# Patient Record
Sex: FEMALE | URBAN - METROPOLITAN AREA
[De-identification: names, ages, dates, MRNs, and addresses within clinical notes are randomized per-mention and may not be internally consistent; named-entity substitution may affect disease eponyms.]

---

## 2024-10-01 ENCOUNTER — OFFICE VISIT (OUTPATIENT)
Dept: URGENT CARE | Age: 39
End: 2024-10-01
Payer: COMMERCIAL

## 2024-10-01 VITALS
TEMPERATURE: 98 F | DIASTOLIC BLOOD PRESSURE: 81 MMHG | OXYGEN SATURATION: 100 % | RESPIRATION RATE: 14 BRPM | SYSTOLIC BLOOD PRESSURE: 128 MMHG | HEART RATE: 49 BPM | WEIGHT: 150 LBS

## 2024-10-01 DIAGNOSIS — N30.01 ACUTE CYSTITIS WITH HEMATURIA: Primary | ICD-10-CM

## 2024-10-01 DIAGNOSIS — R30.0 DYSURIA: ICD-10-CM

## 2024-10-01 LAB
POC APPEARANCE, URINE: ABNORMAL
POC BILIRUBIN, URINE: NEGATIVE
POC BLOOD, URINE: ABNORMAL
POC COLOR, URINE: YELLOW
POC GLUCOSE, URINE: NEGATIVE MG/DL
POC KETONES, URINE: NEGATIVE MG/DL
POC LEUKOCYTES, URINE: ABNORMAL
POC NITRITE,URINE: NEGATIVE
POC PH, URINE: 6 PH
POC PROTEIN, URINE: NEGATIVE MG/DL
POC SPECIFIC GRAVITY, URINE: 1.01
POC UROBILINOGEN, URINE: 0.2 EU/DL
PREGNANCY TEST URINE, POC: NEGATIVE

## 2024-10-01 PROCEDURE — 87086 URINE CULTURE/COLONY COUNT: CPT

## 2024-10-01 RX ORDER — NITROFURANTOIN 25; 75 MG/1; MG/1
100 CAPSULE ORAL 2 TIMES DAILY
Qty: 14 CAPSULE | Refills: 0 | Status: SHIPPED | OUTPATIENT
Start: 2024-10-01 | End: 2024-10-08

## 2024-10-01 ASSESSMENT — PAIN SCALES - GENERAL: PAINLEVEL: 3

## 2024-10-01 NOTE — PROGRESS NOTES
Subjective   Patient ID: Madelaine Mariscal is a 39 y.o. female. They present today with a chief complaint of Female Dysuria (Pressure and burning with urination X today. ).    History of Present Illness  Cindy is a 39-year-old female who presents to the urgent care for evaluation of urinary burning and pelvic pressure for about 1 day.  Patient denies fever, severe abdominal pain or vomiting or diarrhea.  Patient requesting evaluation for possible UTI.  Patient denies any abnormal discharge.  No other symptoms or concerns otherwise reported.    Past Medical History  Allergies as of 10/01/2024    (No Known Allergies)       (Not in a hospital admission)       No past medical history on file.    No past surgical history on file.         Review of Systems  A 10-point review of systems was performed, otherwise unremarkable unless stated in the history of present illness.                Objective    Vitals:    10/01/24 1851   BP: 128/81   Pulse: (!) 49   Resp: 14   Temp: 36.7 °C (98 °F)   SpO2: 100%   Weight: 68 kg (150 lb)     No LMP recorded.    Gen: Vitals noted and reviewed, no evidence of acute distress, well developed and afebrile.   Psych: Mood and affect appropriate for setting.  Head/Face: Atraumatic and normocephalic.   Cardiac: Regular rate and rhythm no murmur.   Lungs: Clear to auscultation throughout, No evidence of wheezing, rhonchi or crackles. Good aeration throughout.   Abdomen: Soft, minimally tender in suprapubic region and otherwise non-tender throughout. Normoactive bowel sounds. No evidence of masses. No CVA tenderness    Extremities: Symmetrical, No peripheral edema  Skin: Without evidence of ecchymosis, wounds, or rashes.      Point of Care Test & Imaging Results from this visit  Results for orders placed or performed in visit on 10/01/24   POCT pregnancy, urine manually resulted   Result Value Ref Range    Preg Test, Ur Negative Negative   POCT UA Automated manually resulted   Result Value Ref Range     POC Color, Urine Yellow Straw, Yellow, Light-Yellow    POC Appearance, Urine Cloudy (A) Clear    POC Glucose, Urine NEGATIVE NEGATIVE mg/dl    POC Bilirubin, Urine NEGATIVE NEGATIVE    POC Ketones, Urine NEGATIVE NEGATIVE mg/dl    POC Specific Gravity, Urine 1.010 1.005 - 1.035    POC Blood, Urine SMALL (1+) (A) NEGATIVE    POC PH, Urine 6.0 No Reference Range Established PH    POC Protein, Urine NEGATIVE NEGATIVE, 30 (1+) mg/dl    POC Urobilinogen, Urine 0.2 0.2, 1.0 EU/DL    Poc Nitrite, Urine NEGATIVE NEGATIVE    POC Leukocytes, Urine MODERATE (2+) (A) NEGATIVE      No results found.    Diagnostic study results (if any) were reviewed by Ashia Brody DO.    Assessment/Plan   Allergies, medications, history, and pertinent labs/EKGs/Imaging reviewed by Ashia Brody DO.     Medical Decision Making  Discussed with the patient symptoms and clinical presentation findings are suggestive of an acute cystitis with hematuria.  We advise close symptom monitoring and supportive treatment.  Reviewed patient allergies to medications and agreed to initiate antimicrobial coverage with Macrobid.  Will follow-up on culture and sensitivities and adjust treatment accordingly. Follow up with PCP. We advised seeking immediate emergency medical attention if symptoms fail to improve, worsen or any concerning symptoms arise. Patient voiced full understanding and agreement to plan.      Orders and Diagnoses  Diagnoses and all orders for this visit:  Dysuria  -     POCT pregnancy, urine manually resulted  -     POCT UA Automated manually resulted      Medical Admin Record      Patient disposition: Home    Electronically signed by Ashia Brody DO  6:56 PM

## 2024-10-03 LAB — BACTERIA UR CULT: NORMAL

## 2025-05-15 LAB
NON-UH HIE HEPATITIS B SURFACE ANTIGEN: NORMAL
NON-UH HIE HEPATITIS C ANTIBODY: NORMAL
NON-UH HIE HIV  PANEL 1: NORMAL

## 2025-05-18 LAB
NON-UH HIE HSV 1, GLYCOPROTEIN AB, IGG: <0.01
NON-UH HIE HSV2 GLYCO AB IGG: 0.04

## 2025-05-19 LAB
NON-UH HIE GP HPV: ABNORMAL
NON-UH HIE MISC SENDOUT: NORMAL

## 2025-05-21 LAB — NON-UH HIE THINPREP TIS PAP: NORMAL

## 2025-05-22 LAB
NON-UH HIE GENOTYPE 16: NEGATIVE
NON-UH HIE GENOTYPE 18/45: NEGATIVE